# Patient Record
Sex: FEMALE | Race: WHITE | NOT HISPANIC OR LATINO | Employment: OTHER | URBAN - METROPOLITAN AREA
[De-identification: names, ages, dates, MRNs, and addresses within clinical notes are randomized per-mention and may not be internally consistent; named-entity substitution may affect disease eponyms.]

---

## 2025-08-04 ENCOUNTER — APPOINTMENT (OUTPATIENT)
Dept: RADIOLOGY | Facility: HOSPITAL | Age: 75
End: 2025-08-04
Payer: MEDICARE

## 2025-08-04 ENCOUNTER — HOSPITAL ENCOUNTER (EMERGENCY)
Facility: HOSPITAL | Age: 75
Discharge: HOME | End: 2025-08-04
Payer: MEDICARE

## 2025-08-04 VITALS
RESPIRATION RATE: 18 BRPM | HEIGHT: 67 IN | WEIGHT: 180 LBS | BODY MASS INDEX: 28.25 KG/M2 | SYSTOLIC BLOOD PRESSURE: 151 MMHG | OXYGEN SATURATION: 100 % | HEART RATE: 67 BPM | TEMPERATURE: 98.2 F | DIASTOLIC BLOOD PRESSURE: 98 MMHG

## 2025-08-04 DIAGNOSIS — S90.851A FOREIGN BODY IN RIGHT FOOT, INITIAL ENCOUNTER: Primary | ICD-10-CM

## 2025-08-04 PROCEDURE — 73630 X-RAY EXAM OF FOOT: CPT | Mod: RIGHT SIDE | Performed by: RADIOLOGY

## 2025-08-04 PROCEDURE — 2500000001 HC RX 250 WO HCPCS SELF ADMINISTERED DRUGS (ALT 637 FOR MEDICARE OP): Performed by: PHYSICIAN ASSISTANT

## 2025-08-04 PROCEDURE — 90471 IMMUNIZATION ADMIN: CPT | Performed by: PHYSICIAN ASSISTANT

## 2025-08-04 PROCEDURE — 73630 X-RAY EXAM OF FOOT: CPT | Mod: RT

## 2025-08-04 PROCEDURE — 90715 TDAP VACCINE 7 YRS/> IM: CPT | Performed by: PHYSICIAN ASSISTANT

## 2025-08-04 PROCEDURE — 28190 REMOVAL OF FOOT FOREIGN BODY: CPT | Performed by: PHYSICIAN ASSISTANT

## 2025-08-04 PROCEDURE — 99283 EMERGENCY DEPT VISIT LOW MDM: CPT | Mod: 25

## 2025-08-04 PROCEDURE — 2500000004 HC RX 250 GENERAL PHARMACY W/ HCPCS (ALT 636 FOR OP/ED): Performed by: PHYSICIAN ASSISTANT

## 2025-08-04 RX ORDER — BACITRACIN ZINC 500 UNIT/G
1 OINTMENT (GRAM) TOPICAL 2 TIMES DAILY
Qty: 28.4 G | Refills: 0 | Status: SHIPPED | OUTPATIENT
Start: 2025-08-04 | End: 2025-08-14

## 2025-08-04 RX ORDER — CEPHALEXIN 500 MG/1
500 CAPSULE ORAL ONCE
Status: COMPLETED | OUTPATIENT
Start: 2025-08-04 | End: 2025-08-04

## 2025-08-04 RX ORDER — ACETAMINOPHEN 500 MG
1000 TABLET ORAL ONCE
Status: DISCONTINUED | OUTPATIENT
Start: 2025-08-04 | End: 2025-08-04 | Stop reason: HOSPADM

## 2025-08-04 RX ORDER — CEPHALEXIN 500 MG/1
500 CAPSULE ORAL 4 TIMES DAILY
Qty: 28 CAPSULE | Refills: 0 | Status: SHIPPED | OUTPATIENT
Start: 2025-08-04 | End: 2025-08-11

## 2025-08-04 RX ORDER — BACITRACIN ZINC 500 UNIT/G
1 OINTMENT (GRAM) TOPICAL 2 TIMES DAILY
Qty: 28 G | Refills: 0 | Status: SHIPPED | OUTPATIENT
Start: 2025-08-04 | End: 2025-08-04

## 2025-08-04 RX ORDER — CEPHALEXIN 500 MG/1
500 CAPSULE ORAL 4 TIMES DAILY
Qty: 28 CAPSULE | Refills: 0 | Status: SHIPPED | OUTPATIENT
Start: 2025-08-04 | End: 2025-08-04

## 2025-08-04 RX ADMIN — TETANUS TOXOID, REDUCED DIPHTHERIA TOXOID AND ACELLULAR PERTUSSIS VACCINE, ADSORBED 0.5 ML: 5; 2.5; 8; 8; 2.5 SUSPENSION INTRAMUSCULAR at 16:20

## 2025-08-04 RX ADMIN — CEPHALEXIN 500 MG: 500 CAPSULE ORAL at 16:21

## 2025-08-04 ASSESSMENT — PAIN DESCRIPTION - LOCATION: LOCATION: FOOT

## 2025-08-04 ASSESSMENT — PAIN SCALES - GENERAL: PAINLEVEL_OUTOF10: 3

## 2025-08-04 ASSESSMENT — PAIN - FUNCTIONAL ASSESSMENT: PAIN_FUNCTIONAL_ASSESSMENT: 0-10

## 2025-08-04 ASSESSMENT — PAIN DESCRIPTION - PAIN TYPE: TYPE: ACUTE PAIN

## 2025-08-04 NOTE — ED PROVIDER NOTES
HPI   Chief Complaint   Patient presents with    Foot Injury       HPI  75-year-old female presenting to the emergency department for evaluation of foreign body in the right foot.  Patient states that she did not know there was toothpick on the ground and excellently stepped on a toothpick with her right foot.  She tried to get some of the toothpick out but only got a small amount out.  She presents for evaluation.  She is having significant pain in the right foot.  The foreign body is just below her big toe and second digit on the plantar aspect.    Please see HPI for pertinent positive and negative ROS.       Patient History   Medical History[1]  Surgical History[2]  Family History[3]  Social History[4]    Physical Exam   ED Triage Vitals [08/04/25 1536]   Temperature Heart Rate Respirations BP   36.8 °C (98.2 °F) 81 16 (!) 151/98      Pulse Ox Temp src Heart Rate Source Patient Position   98 % -- -- Sitting      BP Location FiO2 (%)     -- --       Physical Exam  GENERAL APPEARANCE: This patient is in no acute respiratory distress.   VITAL SIGNS: As per the nurses' triage record.  HEENT: Normocephalic, atraumatic.  NECK:  full gross ROM during exam  MUSCULOSKELETAL:  Full gross active range of motion of the right ankle in all digits of right foot.  Puncture wound over the plantar aspect at the base of the first digit of the right foot.  No surrounding crepitus, erythema, warmth or purulent drainage full range of motion of all digits of the right foot without difficulties.  Sensations intact of the right foot.  2+ pedal pulse of the right foot. Ambulating on own with no acute difficulties  NEUROLOGICAL: Awake, alert and oriented x 3.  IMMUNOLOGICAL: No palpable lymphadenopathy or lymphatic streaking noted on visible skin.  DERM: No petechiae, rashes, or ecchymoses on visible skin  PSYCH: mood and affect appear normal.      ED Course & MDM   Diagnoses as of 08/04/25 1713   Foreign body in right foot, initial  encounter                 No data recorded     Dave Coma Scale Score: 15 (08/04/25 1538 : Hattie Connelly RN)                           Medical Decision Making  Parts of this chart have been completed using voice recognition software. Please excuse any errors of transcription.  My thought process and reason for plan has been formulated from the time that I saw the patient until the time of disposition and is not specific to one specific moment during their visit and furthermore my MDM encompasses this entire chart and not only this text box.      HPI: Detailed above.    Exam: A medically appropriate exam performed, outlined above, given the known history and presentation.    History obtained from: Patient      Medications given during visit:  Medications   acetaminophen (Tylenol) tablet 1,000 mg (1,000 mg oral Not Given 8/4/25 1620)   cephalexin (Keflex) capsule 500 mg (500 mg oral Given 8/4/25 1621)   diphth,pertus(acell),tetanus (BoostRIX) 2.5-8-5 Lf-mcg-Lf/0.5mL vaccine 0.5 mL (0.5 mL intramuscular Given 8/4/25 1620)        Diagnostic/tests  Labs Reviewed - No data to display   XR foot right 3+ views   Final Result   Diffuse soft tissue swelling. No definite radiopaque foreign body   identified.        MACRO:   None.        Signed by: Danielle Castro 8/4/2025 4:49 PM   Dictation workstation:   [id number]           Considerations/further MDM:    Patient presents for foreign body in the right foot.  I did remove toothpick from right foot.  Tetanus booster given along with oral cephalexin.  X-ray of the right foot afterwards shows diffuse soft tissue swelling but there is no definite radiopaque foreign body.  I did remove the toothpick intact.  Patient was discharged with a prescription for cephalexin and educated on wound care.  Return precautions were discussed including signs and symptoms of developing infection.  Patient verbalized understanding and was discharged in stable condition.      Procedure  Foreign  Body Removal - Embedded    Performed by: Angela Santizo PA-C  Authorized by: Angela Santizo PA-C    Consent:     Consent obtained:  Verbal    Consent given by:  Patient    Risks discussed:  Bleeding, infection, pain and incomplete removal    Alternatives discussed:  No treatment  Universal protocol:     Patient identity confirmed:  Verbally with patient  Location:     Location:  Foot    Foot location:  R sole    Depth:  Subcutaneous  Pre-procedure details:     Neurovascular status: intact    Anesthesia:     Anesthesia method:  Local infiltration    Local anesthetic:  Lidocaine 1% w/o epi  Procedure type:     Procedure complexity:  Simple  Procedure details:     Removal mechanism:  Forceps    Foreign bodies recovered:  1    Description:  Toothpick    Intact foreign body removal: yes    Post-procedure details:     Neurovascular status: intact      Skin closure:  None    Dressing:  Antibiotic ointment and bulky dressing    Procedure completion:  Tolerated         [1] No past medical history on file.  [2] No past surgical history on file.  [3] No family history on file.  [4]   Social History  Tobacco Use    Smoking status: Not on file    Smokeless tobacco: Not on file   Substance Use Topics    Alcohol use: Not on file    Drug use: Not on file        Angela Santizo PA-C  08/05/25 1946

## 2025-08-04 NOTE — DISCHARGE INSTRUCTIONS
Please take cephalexin as prescribed and you bacitracin ointment as prescribed.  Please keep your foot clean and dry.  I do recommend trying to avoid wearing shoes is much as possible and elevating your foot on pillows above heart level.  If you start to have spreading redness or warmth or purulent drainage from the foot return to the emergency department, or if you bring Fevers, chills, difficulties moving your foot or ankle